# Patient Record
(demographics unavailable — no encounter records)

---

## 2025-02-10 NOTE — ASSESSMENT
[FreeTextEntry1] : I have recommended to the parents and the patient the following  If they are able to obtain that the EMS records I would like to review the vital signs at the time of the event I have recommended a 24-hour EEG as well as a brain MRI without contrast. I have recommended that they consider doing a BP monitor to see whether he has labile blood pressure. Once we review the testing we will be able to recommend further follow-up including possible visit to an autonomic center in [place]

## 2025-02-10 NOTE — DISCUSSION/SUMMARY
[FreeTextEntry1] : 23-year-old man with a history of vasovagal syncope for many years with one recent episode of profound mental status changes following an unusual episode.  Limited workup has been negative so far with normal echo, EKG, stress test and event tilt test.

## 2025-02-10 NOTE — REVIEW OF SYSTEMS
[Seizures] : convulsions [Fainting] : fainting [Lightheadedness] : lightheadedness [As Noted in HPI] : as noted in HPI [Anxiety] : anxiety [Negative] : Musculoskeletal

## 2025-02-10 NOTE — HISTORY OF PRESENT ILLNESS
[Parents] : parents [Home] : at home, [unfilled] , at the time of the visit. [Medical Office: ([facility])___] : at the medical office located in  [Telehealth (audio & video)] : This visit was provided via telehealth using real-time 2-way audio visual technology. [Verbal consent obtained from patient] : the patient, [unfilled] [FreeTextEntry1] : This is an educational consultation  The patient is a 23-year-old right-handed single male from [place] who was seen via telehealth with his parents. The patient past medical history is unremarkable.  He was born almost at term by .  Third pregnancy uncomplicated.   Milestones were normal. He has 2 siblings were well.  He had a couple of surgical repairs of shoulder injuries and hernia repair.  Patient denies any allergies  The patient is taking no medications at the present time HPI  The parents and patient report that around age 1213 he began having presyncopal and syncopal episodes particularly associated with physical exercise.  He was seen by a cardiologist who told the parents that he had vasovagal syncope and he did not need any further testing.  Since then his symptoms have gotten some worse.  He has had multiple syncopal and presyncopal events.  He often reports mild episodes in symptoms every few months and at times more frequently depending on his level of anxiety and stress.  The patient reports that in 2022 he had measures convulsive syncope episode.  He did not have any further workup at the time.  The most troubling event was in 2024 when he had a prolonged episode where he did not return to normal consciousness for a while.  He was in the gym and about to start exercising when he apparently began having the symptoms.  He says that he lie down to prevent him from losing consciousness but then began having what appears to be convulsions.  EMS was called in and it took him a while to get him stabilized.  He was taken to the ER eventually and he he reports that he remains somewhat confused about the whole episode lasting almost an hour.  He was admitted to the hospital because of his prolonged postictal symptoms.  He was seen by neurology.  CT of the brain was normal.  EEG 1 hour was normal.  He had a tilt test later on that apparently was reported to be normal.  Since then he has been okay.  He has not had any loss of consciousness but he continues to have infrequent episodes of presyncopal symptoms.  He did not have any brain MRI or a prolonged EEG.

## 2025-05-19 NOTE — REASON FOR VISIT
[Parents] : parents [Home] : at home, [unfilled] , at the time of the visit. [Medical Office: ([facility])___] : at the medical office located in  [Telehealth (audio & video)] : This visit was provided via telehealth using real-time 2-way audio visual technology. [Verbal consent obtained from patient] : the patient, [unfilled]

## 2025-05-19 NOTE — ASSESSMENT
[FreeTextEntry1] : Plan Suggested lifestyle changes for pre-syncope H2O intake Salt intake Gym Avoid drinks that cause diuresis if he has symptoms, immediately lie down flat  R/up PRN

## 2025-05-19 NOTE — HISTORY OF PRESENT ILLNESS
[FreeTextEntry1] : This is an educational consultation Follow up  PMx  The patient is a 23-year-old right-handed single male from [place] who was seen via telehealth with his parents. The patient past medical history is unremarkable.  He was born almost at term by .  Third pregnancy uncomplicated.   Milestones were normal. He has 2 siblings were well.  He had a couple of surgical repairs of shoulder injuries and hernia repair.  Patient denies any allergies  The patient is taking no medications at the present time HPI  The parents and patient report that around age 1213 he began having presyncopal and syncopal episodes particularly associated with physical exercise.  He was seen by a cardiologist who told the parents that he had vasovagal syncope and he did not need any further testing.  Since then his symptoms have gotten some worse.  He has had multiple syncopal and presyncopal events.  He often reports mild episodes in symptoms every few months and at times more frequently depending on his level of anxiety and stress.  The patient reports that in 2022 he had measures convulsive syncope episode.  He did not have any further workup at the time.  The most troubling event was in 2024 when he had a prolonged episode where he did not return to normal consciousness for a while.  He was in the gym and about to start exercising when he apparently began having the symptoms.  He says that he lie down to prevent him from losing consciousness but then began having what appears to be convulsions.  EMS was called in and it took him a while to get him stabilized.  He was taken to the ER eventually and he he reports that he remains somewhat confused about the whole episode lasting almost an hour.  He was admitted to the hospital because of his prolonged postictal symptoms.  He was seen by neurology.  CT of the brain was normal.  EEG 1 hour was normal.  He had a tilt test later on that apparently was reported to be normal.  Since then he has been okay.  He has not had any loss of consciousness but he continues to have infrequent episodes of presyncopal symptoms.  He did not have any brain MRI or a prolonged EEG.   HPI      2025  Stable No further events of syncope Some lightheadedness Drinks he says enough water and salty stuff Goes to the GYM often  Workup AEEG: normal Tile test: normal BPM: normal EKG: normal Echo: normal MRI: brain normal